# Patient Record
Sex: MALE | Race: BLACK OR AFRICAN AMERICAN | NOT HISPANIC OR LATINO | Employment: FULL TIME | URBAN - METROPOLITAN AREA
[De-identification: names, ages, dates, MRNs, and addresses within clinical notes are randomized per-mention and may not be internally consistent; named-entity substitution may affect disease eponyms.]

---

## 2018-02-22 ENCOUNTER — HOSPITAL ENCOUNTER (EMERGENCY)
Facility: HOSPITAL | Age: 43
Discharge: HOME | End: 2018-02-22
Attending: EMERGENCY MEDICINE
Payer: MEDICAID

## 2018-02-22 VITALS
BODY MASS INDEX: 35.55 KG/M2 | SYSTOLIC BLOOD PRESSURE: 126 MMHG | DIASTOLIC BLOOD PRESSURE: 70 MMHG | HEART RATE: 95 BPM | RESPIRATION RATE: 16 BRPM | OXYGEN SATURATION: 98 % | WEIGHT: 240 LBS | TEMPERATURE: 98 F | HEIGHT: 69 IN

## 2018-02-22 DIAGNOSIS — M25.572 LEFT ANKLE PAIN: ICD-10-CM
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
## 2018-02-22 DIAGNOSIS — S82.832A OTHER CLOSED FRACTURE OF DISTAL END OF LEFT FIBULA, INITIAL ENCOUNTER: Primary | ICD-10-CM
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
## 2018-02-22 PROCEDURE — 99283 EMERGENCY DEPT VISIT LOW MDM: CPT | Mod: 25

## 2018-02-22 PROCEDURE — 99284 EMERGENCY DEPT VISIT MOD MDM: CPT | Mod: ,,, | Performed by: EMERGENCY MEDICINE

## 2018-02-22 PROCEDURE — 29515 APPLICATION SHORT LEG SPLINT: CPT | Mod: LT

## 2018-02-22 NOTE — ED NOTES
Pt reports left ankle injury x 3 days. Pt reports increased pain to ankle, began using cane to assist with ambulation. Pt reports swelling to ankle that has resolved at this time. Pt reports pain localized to outer let ankle. Pt denies numbness and tingling.      LOC: The patient is awake, alert and aware of environment with an appropriate affect, the patient is oriented x 3 and speaking appropriately.  APPEARANCE: Patient resting comfortably and in no acute distress, patient is clean and well groomed  SKIN: The skin is warm and dry, color consistent with ethnicity, patient has normal skin turgor and moist mucus membranes, skin intact, no breakdown or bruising noted.  MUSCULOSKELETAL: Patient moving all extremities well except left ankle, no obvious swelling or deformities noted.   NEUROLOGIC: Purposeful motor response noted, normal sensation in all extremities when touched with a finger.

## 2018-02-22 NOTE — ED PROVIDER NOTES
"Encounter Date: 2/22/2018    SCRIBE #1 NOTE: I, Shelbie Foster, am scribing for, and in the presence of,  Dr. Jones. I have scribed the entire note.       History     Chief Complaint   Patient presents with    Ankle Pain     Pt c/o left ankle pain-reports slipping down stairs a few days ago.     Time patient was seen by the provider: 2:11 PM      The patient is a 42 y.o. male with co-morbidities including: HTN who presents to the ED with a complaint of left ankle pain after a fall on some stairs onto his left leg a few days ago. Pt fell into the person in front of him and caused his leg to "fall flat to the side". Pt was able to walk on it immediately following the accident but shortly afterwards, pain increased and he was unable to bear weight on the leg. He now walks with a limp and has been using a cane. Pt reports some swelling which was somewhat alleviated by soaking the ankle, however, neither this nor Aleve provided relief of his pain. He denies any hx of injury to that leg. He is still able to work as a .       The history is provided by the patient and medical records.     Review of patient's allergies indicates:  No Known Allergies  Past Medical History:   Diagnosis Date    Hypertension      History reviewed. No pertinent surgical history.  History reviewed. No pertinent family history.  Social History   Substance Use Topics    Smoking status: Never Smoker    Smokeless tobacco: Not on file    Alcohol use No     Review of Systems   Constitutional: Negative for fever.   HENT: Negative for sore throat.    Eyes: Negative for visual disturbance.   Respiratory: Negative for shortness of breath.    Cardiovascular: Negative for chest pain.   Gastrointestinal: Negative for nausea.   Genitourinary: Negative for dysuria.   Musculoskeletal: Positive for arthralgias (left ankle) and joint swelling (left ankle). Negative for back pain.   Skin: Negative for rash.   Neurological: Negative for weakness. "       Physical Exam     Initial Vitals [02/22/18 1358]   BP Pulse Resp Temp SpO2   126/70 95 16 98.3 °F (36.8 °C) 98 %      MAP       88.67         Physical Exam    Nursing note and vitals reviewed.  Constitutional: He appears well-developed and well-nourished. No distress.   Cardiovascular: Normal rate, regular rhythm and normal heart sounds. Exam reveals no gallop and no friction rub.    No murmur heard.  Pulmonary/Chest: Breath sounds normal. No respiratory distress. He has no wheezes. He has no rhonchi. He has no rales.   Musculoskeletal:   Pain with inversion and eversion of the left ankle but no pain with plantar and dorsiflexion of the ankle. No tenderness of the proximal tibia/fibula, no tenderness of the foot including the 5th metatarsal. There is tenderness at the lateral malleolus and just proximal to this at the distal fibula. No tenderness at the medial malleolus.    Neurological: He is alert and oriented to person, place, and time. No sensory deficit.   Skin: Skin is warm and dry. Capillary refill takes less than 2 seconds. No rash noted.         ED Course   Orthopedic Injury  Date/Time: 2/22/2018 4:02 PM  Performed by: PALAK OCHOA.  Authorized by: PALAK OCHOA.     Consent Done?:  Not Needed  Injury:     Injury location:  Lower leg    Location details:  Left lower leg    Injury type:  Fracture    Fracture type: fibular shaft        Pre-procedure assessment:     Neurovascular status: Neurovascularly intact      Distal perfusion: normal      Neurological function: normal      Range of motion: normal        Selections made in this section will also lock the Injury type section above.:     Manipulation performed?: No      Splint type: walking boot.      Labs Reviewed - No data to display       X-Rays:   Independently Interpreted Readings:   Other Readings:  Left Ankle: a lucency through the distal fibula concerning for nondisplaced fx.     Medical Decision Making:   History:   Old  Medical Records: I decided to obtain old medical records.  Initial Assessment:   42 year old man with ankle injury. Pain with bearing weight. My initial differential diagnoses include, but are not limited to: distal fibula fx, ankle sprain, other ankle fx. Will get XR.   Independently Interpreted Test(s):   I have ordered and independently interpreted X-rays - see prior notes.  Clinical Tests:   Radiological Study: Ordered and Reviewed            Scribe Attestation:   Scribe #1: I performed the above scribed service and the documentation accurately describes the services I performed. I attest to the accuracy of the note.    Attending Attestation:           Physician Attestation for Scribe:      Comments: I, Dr. Mela Babin, personally performed the services described in this documentation. All medical record entries made by the scribe were at my direction and in my presence.  I have reviewed the chart and agree that the record reflects my personal performance and is accurate and complete. Mela Babin MD.  4:02 PM 02/22/2018      Attending ED Notes:   XR shows a distal fibula fx, nondisplaced. Will give a walking boot, have recommended follow up with orthopedics. Recommended tylenol for pain. Pt reports he does not feel as if he will need anything stronger than that.               Clinical Impression:   The primary encounter diagnosis was Other closed fracture of distal end of left fibula, initial encounter. A diagnosis of Left ankle pain was also pertinent to this visit.    Disposition:   Disposition: Discharged  Condition: Stable                        Mela Babin MD  02/22/18 3201

## 2018-02-22 NOTE — ED NOTES
Bed: [facility] 01  Expected date: 2/21/18  Expected time: 11:47 AM  Means of arrival:   Comments:

## 2020-10-22 ENCOUNTER — OCCUPATIONAL HEALTH (OUTPATIENT)
Dept: URGENT CARE | Facility: CLINIC | Age: 45
End: 2020-10-22

## 2020-10-22 VITALS
TEMPERATURE: 98 F | SYSTOLIC BLOOD PRESSURE: 120 MMHG | HEART RATE: 89 BPM | WEIGHT: 256 LBS | HEIGHT: 69 IN | BODY MASS INDEX: 37.92 KG/M2 | DIASTOLIC BLOOD PRESSURE: 75 MMHG

## 2020-10-22 DIAGNOSIS — Z02.89 ENCOUNTER FOR EXAMINATION REQUIRED BY DEPARTMENT OF TRANSPORTATION (DOT): Primary | ICD-10-CM

## 2020-10-22 PROCEDURE — 99499 UNLISTED E&M SERVICE: CPT | Mod: S$GLB,,, | Performed by: PHYSICIAN ASSISTANT

## 2020-10-22 PROCEDURE — 99499 PHYSICAL, RECERT DOT/CDL: ICD-10-PCS | Mod: S$GLB,,, | Performed by: PHYSICIAN ASSISTANT

## 2021-05-06 ENCOUNTER — PATIENT MESSAGE (OUTPATIENT)
Dept: RESEARCH | Facility: HOSPITAL | Age: 46
End: 2021-05-06